# Patient Record
Sex: FEMALE | Race: OTHER | NOT HISPANIC OR LATINO
[De-identification: names, ages, dates, MRNs, and addresses within clinical notes are randomized per-mention and may not be internally consistent; named-entity substitution may affect disease eponyms.]

---

## 2019-02-03 PROBLEM — K60.2 ANAL FISSURE: Status: ACTIVE | Noted: 2019-02-03

## 2019-02-03 RX ORDER — ERGOCALCIFEROL 1.25 MG/1
1.25 MG CAPSULE, LIQUID FILLED ORAL
Refills: 0 | Status: ACTIVE | COMMUNITY

## 2019-02-03 RX ORDER — OMEPRAZOLE 40 MG/1
40 CAPSULE, DELAYED RELEASE ORAL
Qty: 90 | Refills: 3 | Status: ACTIVE | COMMUNITY

## 2019-02-03 RX ORDER — METFORMIN HYDROCHLORIDE 1000 MG/1
1000 TABLET, COATED ORAL
Qty: 180 | Refills: 3 | Status: ACTIVE | COMMUNITY

## 2019-02-05 ENCOUNTER — APPOINTMENT (OUTPATIENT)
Dept: HEART AND VASCULAR | Facility: CLINIC | Age: 54
End: 2019-02-05
Payer: COMMERCIAL

## 2019-02-05 DIAGNOSIS — K60.2 ANAL FISSURE, UNSPECIFIED: ICD-10-CM

## 2019-02-26 ENCOUNTER — NON-APPOINTMENT (OUTPATIENT)
Age: 54
End: 2019-02-26

## 2019-02-26 ENCOUNTER — TRANSCRIPTION ENCOUNTER (OUTPATIENT)
Age: 54
End: 2019-02-26

## 2019-02-26 ENCOUNTER — APPOINTMENT (OUTPATIENT)
Dept: HEART AND VASCULAR | Facility: CLINIC | Age: 54
End: 2019-02-26
Payer: COMMERCIAL

## 2019-02-26 VITALS
WEIGHT: 293 LBS | DIASTOLIC BLOOD PRESSURE: 84 MMHG | HEART RATE: 68 BPM | TEMPERATURE: 98.2 F | HEIGHT: 64 IN | BODY MASS INDEX: 50.02 KG/M2 | OXYGEN SATURATION: 99 % | SYSTOLIC BLOOD PRESSURE: 122 MMHG

## 2019-02-26 PROCEDURE — 99204 OFFICE O/P NEW MOD 45 MIN: CPT | Mod: 25

## 2019-02-26 PROCEDURE — 93000 ELECTROCARDIOGRAM COMPLETE: CPT

## 2019-02-26 RX ORDER — WARFARIN SODIUM 7.5 MG/1
7.5 TABLET ORAL DAILY
Refills: 0 | Status: ACTIVE | COMMUNITY

## 2019-02-26 RX ORDER — INSULIN GLARGINE 100 [IU]/ML
INJECTION, SOLUTION SUBCUTANEOUS
Refills: 0 | Status: ACTIVE | COMMUNITY

## 2019-02-28 ENCOUNTER — TRANSCRIPTION ENCOUNTER (OUTPATIENT)
Age: 54
End: 2019-02-28

## 2019-03-07 ENCOUNTER — MEDICATION RENEWAL (OUTPATIENT)
Age: 54
End: 2019-03-07

## 2019-03-14 ENCOUNTER — APPOINTMENT (OUTPATIENT)
Dept: HEART AND VASCULAR | Facility: CLINIC | Age: 54
End: 2019-03-14
Payer: COMMERCIAL

## 2019-04-02 ENCOUNTER — APPOINTMENT (OUTPATIENT)
Dept: HEART AND VASCULAR | Facility: CLINIC | Age: 54
End: 2019-04-02
Payer: COMMERCIAL

## 2019-04-02 VITALS
DIASTOLIC BLOOD PRESSURE: 82 MMHG | OXYGEN SATURATION: 99 % | SYSTOLIC BLOOD PRESSURE: 145 MMHG | TEMPERATURE: 98.8 F | HEART RATE: 71 BPM

## 2019-04-02 PROCEDURE — 93351 STRESS TTE COMPLETE: CPT

## 2019-04-02 NOTE — PHYSICAL EXAM
[S3] : no S3 [S4] : no S4 [Right Carotid Bruit] : no bruit heard over the right carotid [Left Carotid Bruit] : no bruit heard over the left carotid [Right Femoral Bruit] : no bruit heard over the right femoral artery [Left Femoral Bruit] : no bruit heard over the left femoral artery [FreeTextEntry1] : + insulin pump

## 2019-04-02 NOTE — ADDENDUM
[FreeTextEntry1] : 04/02/19: Ms. Mcduffie had an exercise stress echocardiogram today which revealed an EF of 58% and normal wall motion and PA pressures post 4.6 METS of activity.  She may, therefore, proceed with endoscopy and general anesthesia without cardiac contraindications. Ideally, the procedure should be performed on anticoagulation given her history of APL syndrome and multiple venous thromboembolism episodes.  Alternatively, she will need to "bridged" with Lovenox.

## 2019-04-02 NOTE — HISTORY OF PRESENT ILLNESS
[FreeTextEntry1] : Ms. Mcduffie presents for evaluation and management of antiphospholipid syndrome s/p multiple DVTs, SLE, Zamora's esophagus, CAD s/p PCI mid LAD, DM2, dyslipidemia, HTN, and obesity.  She was last seen on 10/20/16.  She will be having bariatric surgery with Cheko Leos MD at [facility] in [place].  She is following with a gastroenterologist, Jose Hauser MD and will have a colonoscopy and EGD in the near future.  She denies chest pain.  She has SABA to 2 flights of stairs.

## 2019-04-02 NOTE — REASON FOR VISIT
[FreeTextEntry1] : Diagnostic Tests:\par --------------------------------------------------------------\par ECG: \par 02/26/19: NSR, normal ECG. \par 10/20/16: NSR, normal ECG. \par 06/16/16: NSR, borderline LVH by voltage criteria. \par 04/10/14: NSR, normal ECG.\par 02/25/13: NSR, normal ECG.\par --------------------------------------------------------------\par Echo: \par 06/30/14: at [facility]: normal LV/RV size and function\par 06/11/12: EF normal, dilated LA, trace MR, PASP 23mmHg\par --------------------------------------------------------------\par Cath: \par 02/07/07: RCA mid 50%, LAD prox 50%, mid 70%, LCx mild, EF 50%, s/p PCI mid LAD (ANTHONY)\par --------------------------------------------------------------\par Stress:\par 07/01/16: regadenoson MPI: EF 53%, normal perfusion.\par 06/30/14: at [facility]: exercise MPI: normal perfusion\par 03/21/13 and 03/22/13: regadenoson MPI: normal perfusion, EF 73%.

## 2019-04-02 NOTE — ASSESSMENT
[FreeTextEntry1] : 1. CAD, s/p PCI mid LAD 02/07/07:\par            - continue aggressive medical management\par            - continue off ASA given need for lifelong systemic anticoagulation\par \par 2. Dyslipidemia:\par           - continue rosuvastatin 20mg po daily\par           - discussed therapeutic lifestyle changes to promote improved lipid metabolism\par           - patient will provide copy of recent lab work \par \par 3. HTN: BP at ACC/AHA 2017 guideline target\par           - continue bisoprolol/HCTZ 2.5/6.25mg po daily\par           - continue losartan 50mg po daily\par \par 4. DM2:\par           - discussed with patient therapeutic lifestyle changes to improve glucose metabolism\par           - continue metformin 1000mg po bid\par         \par 5. Antiphospholipid syndrome: SLE: s/p multiple DVTs\par           - continue lifelong systemic anticoagulation with warfarin (will try to obtain home POC INR monitor)\par           - discussed with patient avoidance of ASA and NSAIDS as well as need for bleeding surveillance.\par           - f/u with hematologist, Jimmy De León MD\par \par 6. Pre-operative for EGD/colonoscopy and bariatric surgery:\par           - will send for an exercise stress echocardiogram to r/o inducible ischemia\par           - will require "bridging" with Lovenox 150mg SC bid \par

## 2019-04-11 ENCOUNTER — MEDICATION RENEWAL (OUTPATIENT)
Age: 54
End: 2019-04-11

## 2019-04-11 DIAGNOSIS — H34.9 UNSPECIFIED RETINAL VASCULAR OCCLUSION: ICD-10-CM

## 2019-04-19 ENCOUNTER — APPOINTMENT (OUTPATIENT)
Dept: HEART AND VASCULAR | Facility: CLINIC | Age: 54
End: 2019-04-19
Payer: COMMERCIAL

## 2019-04-19 VITALS — DIASTOLIC BLOOD PRESSURE: 77 MMHG | SYSTOLIC BLOOD PRESSURE: 145 MMHG

## 2019-04-19 PROCEDURE — 93880 EXTRACRANIAL BILAT STUDY: CPT

## 2019-05-08 ENCOUNTER — APPOINTMENT (OUTPATIENT)
Dept: OTOLARYNGOLOGY | Facility: CLINIC | Age: 54
End: 2019-05-08
Payer: COMMERCIAL

## 2019-05-08 VITALS
TEMPERATURE: 97.9 F | OXYGEN SATURATION: 98 % | SYSTOLIC BLOOD PRESSURE: 120 MMHG | WEIGHT: 293 LBS | HEART RATE: 76 BPM | HEIGHT: 64 IN | DIASTOLIC BLOOD PRESSURE: 82 MMHG | BODY MASS INDEX: 50.02 KG/M2

## 2019-05-08 DIAGNOSIS — Z82.49 FAMILY HISTORY OF ISCHEMIC HEART DISEASE AND OTHER DISEASES OF THE CIRCULATORY SYSTEM: ICD-10-CM

## 2019-05-08 DIAGNOSIS — Z80.9 FAMILY HISTORY OF MALIGNANT NEOPLASM, UNSPECIFIED: ICD-10-CM

## 2019-05-08 DIAGNOSIS — Z81.8 FAMILY HISTORY OF OTHER MENTAL AND BEHAVIORAL DISORDERS: ICD-10-CM

## 2019-05-08 DIAGNOSIS — Z83.3 FAMILY HISTORY OF DIABETES MELLITUS: ICD-10-CM

## 2019-05-08 DIAGNOSIS — Z82.3 FAMILY HISTORY OF STROKE: ICD-10-CM

## 2019-05-08 DIAGNOSIS — Z78.9 OTHER SPECIFIED HEALTH STATUS: ICD-10-CM

## 2019-05-08 DIAGNOSIS — H61.21 IMPACTED CERUMEN, RIGHT EAR: ICD-10-CM

## 2019-05-08 PROCEDURE — 99205 OFFICE O/P NEW HI 60 MIN: CPT | Mod: 25

## 2019-05-08 PROCEDURE — 31575 DIAGNOSTIC LARYNGOSCOPY: CPT

## 2019-05-08 PROCEDURE — 69210 REMOVE IMPACTED EAR WAX UNI: CPT | Mod: RT

## 2019-05-08 RX ORDER — RANITIDINE 150 MG/1
150 TABLET ORAL
Qty: 180 | Refills: 1 | Status: ACTIVE | COMMUNITY
Start: 2019-05-08 | End: 1900-01-01

## 2019-05-08 NOTE — CONSULT LETTER
[Dear  ___] : Dear  [unfilled], [Consult Letter:] : I had the pleasure of evaluating your patient, [unfilled]. [Consult Closing:] : Thank you very much for allowing me to participate in the care of this patient.  If you have any questions, please do not hesitate to contact me. [Please see my note below.] : Please see my note below. [Sincerely,] : Sincerely, [FreeTextEntry3] : MADAI Chilel Jr, MD, FAAOHNS\par Otolaryngologist\par [facility]

## 2019-05-08 NOTE — PROCEDURE
[de-identified] : Indication: requirement for exam not possible via anterior examination; JORDAN, LPR\par After verbal consent and the administration of an aerosolized phenylephrine/lidocaine mix, examination was performed with a flexible endoscope placed through the nose. Findings:\par Nasopharynx: narrow\par Soft palate, lateral and posterior pharyngeal walls: unremarkable\par Base of tongue & lingual tonsil: large lingual tonsil w/ mod retrodisplacement\par Vallecula: unremarkable\par Epiglottis: unremarkable\par Piriform sinuses and pharyngoesophageal junction: unremarkable\par Arytenoids and AE folds: mod-severe interarytenoid edema\par Ventricle and false vocal folds: unremarkable\par True vocal folds: Smooth free edge; surface without ectasias or edema; normal movement bilaterally with good apposition in phonation\par Visible subglottis: unremarkable\par Other findings: ELM & pseudosulcus

## 2019-05-08 NOTE — HISTORY OF PRESENT ILLNESS
[de-identified] : 55 y/o F anticoagulated for antiphospholipid syndrome w/ multiple DVTs who recently had a carotid duplex which showed an incidental finding of a 2.6 cm complex L thyroid nodule. Her GM had thyroid surgery; doesn't know for what. No exp to ionizing radiation. Denies dysphagia. However, complains of a raspy voice intermittently. \par Takes omeprazole 40mg po qd for reflux, which is mostly controlled. Globus w/ clearing. \par JORDAN on CPAP; doesn't know her severity. Preop for bariatric surgery. \par Uses qtips.

## 2019-05-08 NOTE — PHYSICAL EXAM
[de-identified] : very thick, difficult to examine [FreeTextEntry8] : obstructing cerumen removed w/ an alligator [Laryngoscopy Performed] : laryngoscopy was performed, see procedure section for findings [de-identified] : mFTP 3-4, very narrow OP [Normal] : no rashes

## 2019-05-08 NOTE — ASSESSMENT
[FreeTextEntry1] : Agree w/ bariatrics. Reviewed reflux precautions and provided the patient with the corresponding educational handout. Add qhs ranitidine. \par Thyroid sono and RTC\par Avoid qtips.

## 2019-05-10 ENCOUNTER — FORM ENCOUNTER (OUTPATIENT)
Age: 54
End: 2019-05-10

## 2019-05-11 ENCOUNTER — APPOINTMENT (OUTPATIENT)
Dept: ULTRASOUND IMAGING | Facility: CLINIC | Age: 54
End: 2019-05-11
Payer: COMMERCIAL

## 2019-05-11 ENCOUNTER — OUTPATIENT (OUTPATIENT)
Dept: OUTPATIENT SERVICES | Facility: HOSPITAL | Age: 54
LOS: 1 days | End: 2019-05-11

## 2019-05-11 PROCEDURE — 76536 US EXAM OF HEAD AND NECK: CPT | Mod: 26

## 2019-05-18 PROBLEM — E11.9 TYPE II DIABETES MELLITUS: Status: ACTIVE | Noted: 2019-02-03

## 2019-05-18 PROBLEM — I10 HTN (HYPERTENSION), BENIGN: Status: ACTIVE | Noted: 2019-02-03

## 2019-05-18 PROBLEM — E66.01 MORBID OBESITY: Status: ACTIVE | Noted: 2019-02-26

## 2019-05-18 PROBLEM — M32.9 LUPUS (SYSTEMIC LUPUS ERYTHEMATOSUS): Status: ACTIVE | Noted: 2019-02-03

## 2019-05-22 ENCOUNTER — APPOINTMENT (OUTPATIENT)
Dept: OTOLARYNGOLOGY | Facility: CLINIC | Age: 54
End: 2019-05-22
Payer: COMMERCIAL

## 2019-05-22 VITALS
SYSTOLIC BLOOD PRESSURE: 149 MMHG | BODY MASS INDEX: 50.02 KG/M2 | WEIGHT: 293 LBS | DIASTOLIC BLOOD PRESSURE: 82 MMHG | OXYGEN SATURATION: 97 % | HEART RATE: 72 BPM | HEIGHT: 64 IN | TEMPERATURE: 99.2 F

## 2019-05-22 PROCEDURE — 99214 OFFICE O/P EST MOD 30 MIN: CPT

## 2019-05-22 NOTE — ASSESSMENT
[FreeTextEntry1] : Sono-guided FNA and RTC. She will need to coordinate her coumadin as per her hematologist, who has handled invasive procedures for her before using a Lovenox bridge.

## 2019-05-22 NOTE — HISTORY OF PRESENT ILLNESS
[de-identified] : 53 y/o F anticoagulated for antiphospholipid syndrome w/ multiple DVTs who recently had a carotid duplex which showed an incidental finding of a 2.6 cm complex L thyroid nodule; now f/u a sono. Her GM had thyroid surgery; doesn't know for what. No exp to ionizing radiation. Denies dysphagia. However, complains of a raspy voice intermittently; no change in this yet. . \par Takes omeprazole 40mg po qd for reflux, which is better controlled. Globus w/ clearing that is unchanged. \par JORDAN on CPAP; doesn't know her severity. Preop for bariatric surgery. \par Uses qtips.

## 2019-05-22 NOTE — PHYSICAL EXAM
[Normal] : the left external ear was normal [de-identified] : very thick, difficult to examine [de-identified] : mFTP 3-4, very narrow OP

## 2019-05-23 ENCOUNTER — APPOINTMENT (OUTPATIENT)
Dept: HEART AND VASCULAR | Facility: CLINIC | Age: 54
End: 2019-05-23

## 2019-05-23 DIAGNOSIS — E66.01 MORBID (SEVERE) OBESITY DUE TO EXCESS CALORIES: ICD-10-CM

## 2019-05-23 DIAGNOSIS — I10 ESSENTIAL (PRIMARY) HYPERTENSION: ICD-10-CM

## 2019-05-23 DIAGNOSIS — E11.9 TYPE 2 DIABETES MELLITUS W/OUT COMPLICATIONS: ICD-10-CM

## 2019-05-23 DIAGNOSIS — M32.9 SYSTEMIC LUPUS ERYTHEMATOSUS, UNSPECIFIED: ICD-10-CM

## 2019-06-05 ENCOUNTER — FORM ENCOUNTER (OUTPATIENT)
Age: 54
End: 2019-06-05

## 2019-06-06 ENCOUNTER — APPOINTMENT (OUTPATIENT)
Dept: ULTRASOUND IMAGING | Facility: CLINIC | Age: 54
End: 2019-06-06
Payer: COMMERCIAL

## 2019-06-06 ENCOUNTER — RESULT REVIEW (OUTPATIENT)
Age: 54
End: 2019-06-06

## 2019-06-06 ENCOUNTER — OUTPATIENT (OUTPATIENT)
Dept: OUTPATIENT SERVICES | Facility: HOSPITAL | Age: 54
LOS: 1 days | End: 2019-06-06

## 2019-06-06 PROCEDURE — 10005 FNA BX W/US GDN 1ST LES: CPT

## 2019-06-07 LAB — NON-GYNECOLOGICAL CYTOLOGY STUDY: SIGNIFICANT CHANGE UP

## 2019-06-18 ENCOUNTER — APPOINTMENT (OUTPATIENT)
Dept: OTOLARYNGOLOGY | Facility: CLINIC | Age: 54
End: 2019-06-18
Payer: COMMERCIAL

## 2019-06-18 VITALS
HEIGHT: 64 IN | BODY MASS INDEX: 50.02 KG/M2 | OXYGEN SATURATION: 98 % | WEIGHT: 293 LBS | TEMPERATURE: 99.4 F | DIASTOLIC BLOOD PRESSURE: 88 MMHG | SYSTOLIC BLOOD PRESSURE: 135 MMHG | HEART RATE: 74 BPM

## 2019-06-18 PROCEDURE — 99214 OFFICE O/P EST MOD 30 MIN: CPT

## 2019-06-18 NOTE — PHYSICAL EXAM
[de-identified] : very thick, difficult to examine [de-identified] : mFTP 3-4, very narrow OP [Normal] : the left external ear was normal

## 2019-06-18 NOTE — HISTORY OF PRESENT ILLNESS
[de-identified] : 53 y/o F anticoagulated for antiphospholipid syndrome w/ multiple DVTs who recently had a carotid duplex which showed an incidental finding of a 2.6 cm complex L thyroid nodule; now f/u an FNA. Her GM had thyroid surgery; doesn't know for what. No exp to ionizing radiation. Denies dysphagia. However, complains of a raspy voice intermittently; this has improved somewhat since last seen. \par Takes omeprazole 40mg po qd for reflux, which is better controlled. Globus w/ clearing that is somewhat improved. \par JORDAN on CPAP; doesn't know her severity. Preop for bariatric surgery.

## 2019-06-18 NOTE — DATA REVIEWED
[de-identified] : FNA: BC III [de-identified] : 5/19 sono: single L 3.9 x 2.0 x 2.4 cm hypoechoic nodule

## 2019-07-10 ENCOUNTER — FORM ENCOUNTER (OUTPATIENT)
Age: 54
End: 2019-07-10

## 2019-07-11 ENCOUNTER — OUTPATIENT (OUTPATIENT)
Dept: OUTPATIENT SERVICES | Facility: HOSPITAL | Age: 54
LOS: 1 days | End: 2019-07-11

## 2019-07-11 ENCOUNTER — APPOINTMENT (OUTPATIENT)
Dept: ULTRASOUND IMAGING | Facility: HOSPITAL | Age: 54
End: 2019-07-11

## 2019-07-11 ENCOUNTER — RESULT REVIEW (OUTPATIENT)
Age: 54
End: 2019-07-11

## 2019-07-11 ENCOUNTER — OUTPATIENT (OUTPATIENT)
Dept: OUTPATIENT SERVICES | Facility: HOSPITAL | Age: 54
LOS: 1 days | End: 2019-07-11
Payer: COMMERCIAL

## 2019-07-11 PROCEDURE — 10005 FNA BX W/US GDN 1ST LES: CPT

## 2019-07-11 PROCEDURE — 88305 TISSUE EXAM BY PATHOLOGIST: CPT

## 2019-07-11 PROCEDURE — 88173 CYTOPATH EVAL FNA REPORT: CPT

## 2019-07-12 LAB — NON-GYNECOLOGICAL CYTOLOGY STUDY: SIGNIFICANT CHANGE UP

## 2019-07-22 ENCOUNTER — APPOINTMENT (OUTPATIENT)
Dept: OTOLARYNGOLOGY | Facility: CLINIC | Age: 54
End: 2019-07-22
Payer: COMMERCIAL

## 2019-07-22 VITALS
BODY MASS INDEX: 50.02 KG/M2 | TEMPERATURE: 99.6 F | SYSTOLIC BLOOD PRESSURE: 141 MMHG | HEIGHT: 64 IN | DIASTOLIC BLOOD PRESSURE: 86 MMHG | HEART RATE: 90 BPM | WEIGHT: 293 LBS | OXYGEN SATURATION: 97 %

## 2019-07-22 DIAGNOSIS — G47.33 OBSTRUCTIVE SLEEP APNEA (ADULT) (PEDIATRIC): ICD-10-CM

## 2019-07-22 DIAGNOSIS — K21.9 GASTRO-ESOPHAGEAL REFLUX DISEASE W/OUT ESOPHAGITIS: ICD-10-CM

## 2019-07-22 DIAGNOSIS — E04.1 NONTOXIC SINGLE THYROID NODULE: ICD-10-CM

## 2019-07-22 PROCEDURE — 99214 OFFICE O/P EST MOD 30 MIN: CPT

## 2019-07-22 NOTE — DATA REVIEWED
[de-identified] : FNA: BC III\par rpt: BC III; thyroseq pending [de-identified] : 5/19 sono: single L 3.9 x 2.0 x 2.4 cm hypoechoic nodule

## 2019-07-22 NOTE — HISTORY OF PRESENT ILLNESS
[de-identified] : 55 y/o F anticoagulated for antiphospholipid syndrome w/ multiple DVTs who recently had a carotid duplex which showed an incidental finding of a 2.6 cm complex L thyroid nodule; now f/u a rpt FNA. Her GM had thyroid surgery; doesn't know for what. No exp to ionizing radiation. Denies dysphagia. However, complains of a raspy voice intermittently; this continues to improve. \par Takes omeprazole 40mg po qd for reflux, which is better controlled. Globus w/ clearing that is somewhat improved. \par JORDAN on CPAP; doesn't know her severity. Preop for bariatric surgery.

## 2019-07-22 NOTE — PHYSICAL EXAM
[Normal] : the left external ear was normal [de-identified] : very thick, difficult to examine [de-identified] : mFTP 3-4, very narrow OP

## 2019-11-21 ENCOUNTER — APPOINTMENT (OUTPATIENT)
Dept: HEART AND VASCULAR | Facility: CLINIC | Age: 54
End: 2019-11-21

## 2020-02-27 RX ORDER — BISOPROLOL FUMARATE AND HYDROCHLOROTHIAZIDE 2.5; 6.25 MG/1; MG/1
2.5-6.25 TABLET, FILM COATED ORAL DAILY
Qty: 90 | Refills: 3 | Status: ACTIVE | COMMUNITY
Start: 1900-01-01 | End: 1900-01-01

## 2020-02-27 RX ORDER — IRBESARTAN 150 MG/1
150 TABLET ORAL DAILY
Qty: 90 | Refills: 3 | Status: ACTIVE | COMMUNITY
Start: 2020-02-27 | End: 1900-01-01

## 2021-02-26 ENCOUNTER — TRANSCRIPTION ENCOUNTER (OUTPATIENT)
Age: 56
End: 2021-02-26

## 2021-02-28 PROBLEM — E78.5 DYSLIPIDEMIA: Status: ACTIVE | Noted: 2019-02-03

## 2021-02-28 PROBLEM — K22.70 BARRETT ESOPHAGUS: Status: ACTIVE | Noted: 2019-02-03

## 2021-02-28 PROBLEM — I25.10 CAD (CORONARY ARTERY DISEASE): Status: ACTIVE | Noted: 2019-02-03

## 2021-02-28 PROBLEM — D68.61 ANTIPHOSPHOLIPID SYNDROME: Status: ACTIVE | Noted: 2019-02-03

## 2021-03-02 ENCOUNTER — APPOINTMENT (OUTPATIENT)
Dept: HEART AND VASCULAR | Facility: CLINIC | Age: 56
End: 2021-03-02
Payer: COMMERCIAL

## 2021-03-02 VITALS
HEIGHT: 64 IN | WEIGHT: 290 LBS | DIASTOLIC BLOOD PRESSURE: 82 MMHG | SYSTOLIC BLOOD PRESSURE: 127 MMHG | HEART RATE: 85 BPM | BODY MASS INDEX: 49.51 KG/M2

## 2021-03-02 DIAGNOSIS — Z01.818 ENCOUNTER FOR OTHER PREPROCEDURAL EXAMINATION: ICD-10-CM

## 2021-03-02 DIAGNOSIS — I25.10 ATHEROSCLEROTIC HEART DISEASE OF NATIVE CORONARY ARTERY W/OUT ANGINA PECTORIS: ICD-10-CM

## 2021-03-02 DIAGNOSIS — E78.5 HYPERLIPIDEMIA, UNSPECIFIED: ICD-10-CM

## 2021-03-02 DIAGNOSIS — D68.61 ANTIPHOSPHOLIPID SYNDROME: ICD-10-CM

## 2021-03-02 DIAGNOSIS — Z00.00 ENCOUNTER FOR GENERAL ADULT MEDICAL EXAMINATION W/OUT ABNORMAL FINDINGS: ICD-10-CM

## 2021-03-02 DIAGNOSIS — K22.70 BARRETT'S ESOPHAGUS W/OUT DYSPLASIA: ICD-10-CM

## 2021-03-02 PROCEDURE — 99072 ADDL SUPL MATRL&STAF TM PHE: CPT

## 2021-03-02 PROCEDURE — 99214 OFFICE O/P EST MOD 30 MIN: CPT

## 2021-03-02 RX ORDER — LATANOPROST/PF 0.005 %
0.01 DROPS OPHTHALMIC (EYE)
Refills: 0 | Status: ACTIVE | COMMUNITY

## 2021-03-02 RX ORDER — GABAPENTIN 300 MG/1
300 CAPSULE ORAL TWICE DAILY
Refills: 0 | Status: ACTIVE | COMMUNITY

## 2021-03-02 RX ORDER — INSULIN DEGLUDEC INJECTION 100 U/ML
100 INJECTION, SOLUTION SUBCUTANEOUS
Refills: 0 | Status: ACTIVE | COMMUNITY

## 2021-03-17 ENCOUNTER — TRANSCRIPTION ENCOUNTER (OUTPATIENT)
Age: 56
End: 2021-03-17

## 2021-03-17 PROBLEM — Z00.00 ENCOUNTER FOR PREVENTIVE HEALTH EXAMINATION: Status: ACTIVE | Noted: 2019-01-09

## 2021-04-09 ENCOUNTER — APPOINTMENT (OUTPATIENT)
Dept: HEART AND VASCULAR | Facility: CLINIC | Age: 56
End: 2021-04-09
Payer: COMMERCIAL

## 2021-04-09 ENCOUNTER — NON-APPOINTMENT (OUTPATIENT)
Age: 56
End: 2021-04-09

## 2021-04-09 PROCEDURE — 93306 TTE W/DOPPLER COMPLETE: CPT

## 2021-04-09 PROCEDURE — 99072 ADDL SUPL MATRL&STAF TM PHE: CPT

## 2021-04-09 NOTE — ADDENDUM
[FreeTextEntry1] : 03/17/21: Ms. Mcduffie is preoperative for a uterine biopsy.  She has a h/o CAD s/p PCI mid LAD.  Her coronary disease is stable with no active CV symptoms.  She had a normal stress test in 2019.  In addition, she has a history of APL s/p multiple DVTs.  She has no cardiac contraindications to the planned procedure but will need to communicate with the anticoagulation clinic at [facility] to provide instructions surrounding "bridging" warfarin with enoxaparin.

## 2021-04-09 NOTE — REASON FOR VISIT
[Follow-Up - Clinic] : a clinic follow-up of [FreeTextEntry1] : Diagnostic Tests:\par --------------------------------------------------------------\par ECG: \par 02/26/19: NSR, normal ECG. \par 10/20/16: NSR, normal ECG. \par 06/16/16: NSR, borderline LVH by voltage criteria. \par 04/10/14: NSR, normal ECG.\par 02/25/13: NSR, normal ECG.\par --------------------------------------------------------------\par Echo: \par 04/09/21: EF 64%, normal study. \par 06/30/14: at [facility]: normal LV/RV size and function\par 06/11/12: EF normal, dilated LA, trace MR, PASP 23mmHg\par --------------------------------------------------------------\par Cath: \par 02/07/07: RCA mid 50%, LAD prox 50%, mid 70%, LCx mild, EF 50%, s/p PCI mid LAD (ANTHONY)\par --------------------------------------------------------------\par Stress:\par 04/02/19: exercise stress echo: EF 58%, 4.6 METS, normal wall motion and PA pressures. \par 07/01/16: regadenoson MPI: EF 53%, normal perfusion.\par 06/30/14: at [facility]: exercise MPI: normal perfusion\par 03/21/13 and 03/22/13: regadenoson MPI: normal perfusion, EF 73%.\par --------------------------------------------------------------\par Carotid:\par 04/18/19: sono: b/l prox ICA 1-19%, large left thyroid nodule.

## 2021-04-09 NOTE — HISTORY OF PRESENT ILLNESS
[FreeTextEntry1] : Ms. Mcduffie presents for follow up and management of antiphospholipid syndrome s/p multiple DVTs, SLE, Zamora's esophagus, CAD s/p PCI mid LAD, DM2, dyslipidemia, HTN, and obesity.  She was last seen on 02/26/19.  She did not undergo bariatric surgery with Cheko Leos MD at [facility] in [place].  SHe had a second opinion with a weight loss surgeon at [facility], Dr. Bradley and is reconsidering it at this time.   She is following with a gastroenterologist, Jose Hauser MD.  She had an exercise stress echocardiogram on 04/02/19 which revealed an EF of 58%, 4.6 METS, and normal wall motion and PA pressures. She denies chest pain.  On 04/09/21 she had an echocardiogram which revealed an EF of 64% and was a normal study.  She has SABA to 2 flights of stairs.

## 2021-04-09 NOTE — PHYSICAL EXAM
[General Appearance - Well Developed] : well developed [Normal Appearance] : normal appearance [Well Groomed] : well groomed [General Appearance - Well Nourished] : well nourished [No Deformities] : no deformities [General Appearance - In No Acute Distress] : no acute distress [Normal Conjunctiva] : the conjunctiva exhibited no abnormalities [Eyelids - No Xanthelasma] : the eyelids demonstrated no xanthelasmas [Normal Oral Mucosa] : normal oral mucosa [No Oral Pallor] : no oral pallor [No Oral Cyanosis] : no oral cyanosis [Normal Jugular Venous A Waves Present] : normal jugular venous A waves present [Normal Jugular Venous V Waves Present] : normal jugular venous V waves present [No Jugular Venous Chu A Waves] : no jugular venous chu A waves [Exaggerated Use Of Accessory Muscles For Inspiration] : no accessory muscle use [Respiration, Rhythm And Depth] : normal respiratory rhythm and effort [Auscultation Breath Sounds / Voice Sounds] : lungs were clear to auscultation bilaterally [Abdomen Soft] : soft [Abdomen Tenderness] : non-tender [FreeTextEntry1] : + insulin pump [Abdomen Mass (___ Cm)] : no abdominal mass palpated [Abnormal Walk] : normal gait [Gait - Sufficient For Exercise Testing] : the gait was sufficient for exercise testing [Skin Color & Pigmentation] : normal skin color and pigmentation [] : no rash [No Venous Stasis] : no venous stasis [Skin Lesions] : no skin lesions [No Skin Ulcers] : no skin ulcer [No Xanthoma] : no  xanthoma was observed [Oriented To Time, Place, And Person] : oriented to person, place, and time [Affect] : the affect was normal [Mood] : the mood was normal [No Anxiety] : not feeling anxious [Normal Rate] : normal [Normal S1] : normal S1 [Normal S2] : normal S2 [S3] : no S3 [S4] : no S4 [No Murmur] : no murmurs heard [Right Carotid Bruit] : no bruit heard over the right carotid [Left Carotid Bruit] : no bruit heard over the left carotid [Right Femoral Bruit] : no bruit heard over the right femoral artery [Left Femoral Bruit] : no bruit heard over the left femoral artery [2+] : left 2+ [No Abnormalities] : the abdominal aorta was not enlarged and no bruit was heard [No Pitting Edema] : no pitting edema present

## 2021-04-10 LAB
ALBUMIN SERPL ELPH-MCNC: 4.3 G/DL
ALP BLD-CCNC: 93 U/L
ALT SERPL-CCNC: 17 U/L
ANION GAP SERPL CALC-SCNC: 11 MMOL/L
APTT BLD: 45 SEC
AST SERPL-CCNC: 15 U/L
BASOPHILS # BLD AUTO: 0.06 K/UL
BASOPHILS NFR BLD AUTO: 0.7 %
BILIRUB SERPL-MCNC: 0.3 MG/DL
BUN SERPL-MCNC: 9 MG/DL
CALCIUM SERPL-MCNC: 9.8 MG/DL
CHLORIDE SERPL-SCNC: 105 MMOL/L
CHOLEST SERPL-MCNC: 153 MG/DL
CO2 SERPL-SCNC: 29 MMOL/L
CREAT SERPL-MCNC: 0.72 MG/DL
EOSINOPHIL # BLD AUTO: 0.28 K/UL
EOSINOPHIL NFR BLD AUTO: 3.1 %
ESTIMATED AVERAGE GLUCOSE: 200 MG/DL
GLUCOSE SERPL-MCNC: 145 MG/DL
HBA1C MFR BLD HPLC: 8.6 %
HCT VFR BLD CALC: 44.2 %
HDLC SERPL-MCNC: 42 MG/DL
HGB BLD-MCNC: 13.7 G/DL
IMM GRANULOCYTES NFR BLD AUTO: 0.2 %
INR PPP: 1.94 RATIO
LDLC SERPL CALC-MCNC: 82 MG/DL
LDLC SERPL DIRECT ASSAY-MCNC: 83 MG/DL
LYMPHOCYTES # BLD AUTO: 2.96 K/UL
LYMPHOCYTES NFR BLD AUTO: 32.7 %
MAN DIFF?: NORMAL
MCHC RBC-ENTMCNC: 28.1 PG
MCHC RBC-ENTMCNC: 31 GM/DL
MCV RBC AUTO: 90.6 FL
MONOCYTES # BLD AUTO: 0.89 K/UL
MONOCYTES NFR BLD AUTO: 9.8 %
NEUTROPHILS # BLD AUTO: 4.85 K/UL
NEUTROPHILS NFR BLD AUTO: 53.5 %
NONHDLC SERPL-MCNC: 111 MG/DL
PLATELET # BLD AUTO: 329 K/UL
POTASSIUM SERPL-SCNC: 4.5 MMOL/L
PROT SERPL-MCNC: 7.1 G/DL
PT BLD: 22.2 SEC
RBC # BLD: 4.88 M/UL
RBC # FLD: 14.5 %
SODIUM SERPL-SCNC: 144 MMOL/L
TRIGL SERPL-MCNC: 143 MG/DL
TSH SERPL-ACNC: 1.72 UIU/ML
WBC # FLD AUTO: 9.06 K/UL

## 2021-04-10 NOTE — ADDENDUM
[FreeTextEntry1] : 03/17/21: Ms. Mcduffie is preoperative for a uterine biopsy.  She has a h/o CAD s/p PCI mid LAD.  Her coronary disease is stable with no active CV symptoms.  She had a normal stress test in 2019.  In addition, she has a history of APL s/p multiple DVTs.  She has no cardiac contraindications to the planned procedure but will need to communicate with the [place] [facility] [place] instructions surrounding "bridging" warfarin with enoxaparin.

## 2021-04-10 NOTE — PHYSICAL EXAM
[FreeTextEntry1] : + insulin pump [S3] : no S3 [Right Carotid Bruit] : no bruit heard over the right carotid [S4] : no S4 [Left Carotid Bruit] : no bruit heard over the left carotid [Right Femoral Bruit] : no bruit heard over the right femoral artery [Left Femoral Bruit] : no bruit heard over the left femoral artery

## 2021-04-10 NOTE — ASSESSMENT
[FreeTextEntry1] : 1. CAD, s/p PCI mid LAD 02/07/07: s/p 04/02/19: exercise stress echo: EF 58%, 4.6 METS, normal wall motion and PA pressures. \par            - continue aggressive medical management\par            - continue off ASA given need for lifelong systemic anticoagulation\par            - will send for an echocardiogram \par \par 2. Dyslipidemia:\par           - continue rosuvastatin 20mg po daily\par           - discussed therapeutic lifestyle changes to promote improved lipid metabolism\par           - check lab work with next in-person visit \par \par 3. HTN: BP at ACC/AHA 2017 guideline target\par           - continue bisoprolol/HCTZ 2.5/6.25mg po daily\par           - continue losartan 50mg po daily\par \par 4. DM2:\par           - discussed with patient therapeutic lifestyle changes to improve glucose metabolism\par           - continue metformin 1000mg po bid\par           - continue Tesiba\par         \par 5. Antiphospholipid syndrome: SLE: s/p multiple DVTs\par           - continue lifelong systemic anticoagulation with warfarin (will try to obtain home POC INR monitor)\par           - discussed with patient avoidance of ASA and NSAIDS as well as need for bleeding surveillance.\par           - f/u with hematologist, Jimmy De León MD\par \par Due to the current global COVID-19 pandemic and the recommendations for social distancing this encounter was converted from an in-person face-to-face encounter to a telehealth encounter employing the Dispop, Quintura, or other approved audio/video platform.  All components of the evaluation and management were performed per clinical routine with the exception of the physical exam.  The physical exam references my most recent physical exam plus any additional information provided by the patient (i.e. ambulatory vitals/weight) or inspection from the video portion of the encounter. \par \par I spent in excess of 40 minutes on the encounter.  \par \par Verbal consent was given on 03/02/21 by Arabella Mcduffie. \par \par Patient location: The patient was located at the primary address listed in the medical record.\par Physician location: I was located in my office in [facility]. \par

## 2021-04-10 NOTE — REASON FOR VISIT
[Follow-Up - Clinic] : a clinic follow-up of [FreeTextEntry1] : Diagnostic Tests:\par --------------------------------------------------------------\par ECG: \par 02/26/19: NSR, normal ECG. \par 10/20/16: NSR, normal ECG. \par 06/16/16: NSR, borderline LVH by voltage criteria. \par 04/10/14: NSR, normal ECG.\par 02/25/13: NSR, normal ECG.\par --------------------------------------------------------------\par Echo: \par 06/30/14: at [facility]: normal LV/RV size and function\par 06/11/12: EF normal, dilated LA, trace MR, PASP 23mmHg\par --------------------------------------------------------------\par Cath: \par 02/07/07: RCA mid 50%, LAD prox 50%, mid 70%, LCx mild, EF 50%, s/p PCI mid LAD (ANTHONY)\par --------------------------------------------------------------\par Stress:\par 04/02/19: exercise stress echo: EF 58%, 4.6 METS, normal wall motion and PA pressures. \par 07/01/16: regadenoson MPI: EF 53%, normal perfusion.\par 06/30/14: at [facility]: exercise MPI: normal perfusion\par 03/21/13 and 03/22/13: regadenoson MPI: normal perfusion, EF 73%.\par --------------------------------------------------------------\par Carotid:\par 04/18/19: sono: b/l prox ICA 1-19%, large left thyroid nodule.

## 2021-04-10 NOTE — HISTORY OF PRESENT ILLNESS
[FreeTextEntry1] : Ms. Mcduffie presents for follow up and management of antiphospholipid syndrome s/p multiple DVTs, SLE, Zamora's esophagus, CAD s/p PCI mid LAD, DM2, dyslipidemia, HTN, and obesity.  She was last seen on 02/26/19.  She did not undergo bariatric surgery with Cheko Leos MD at [facility] in [place].  SHe had a second opinion with a weight loss surgeon at [facility], Dr. Bradley and is reconsidering it at this time.   She is following with a gastroenterologist, Jose Hauser MD.  She had an exercise stress echocardiogram on 04/02/19 which revealed an EF of 58%, 4.6 METS, and normal wall motion and PA pressures. She denies chest pain.  She has SABA to 2 flights of stairs.

## 2021-06-21 ENCOUNTER — TRANSCRIPTION ENCOUNTER (OUTPATIENT)
Age: 56
End: 2021-06-21

## 2021-08-28 ENCOUNTER — RX RENEWAL (OUTPATIENT)
Age: 56
End: 2021-08-28

## 2021-08-28 RX ORDER — ROSUVASTATIN CALCIUM 20 MG/1
20 TABLET, FILM COATED ORAL DAILY
Qty: 90 | Refills: 3 | Status: ACTIVE | COMMUNITY
Start: 2021-08-28 | End: 1900-01-01

## 2023-10-27 NOTE — ASSESSMENT
[FreeTextEntry1] : 1. CAD, s/p PCI mid LAD 02/07/07: s/p 04/02/19: exercise stress echo: EF 58%, 4.6 METS, normal wall motion and PA pressures. \par            - continue aggressive medical management\par            - continue off ASA given need for lifelong systemic anticoagulation\par            - will send for an echocardiogram \par \par 2. Dyslipidemia:\par           - continue rosuvastatin 20mg po daily\par           - discussed therapeutic lifestyle changes to promote improved lipid metabolism\par           - check lab work with next in-person visit \par \par 3. HTN: BP at ACC/AHA 2017 guideline target\par           - continue bisoprolol/HCTZ 2.5/6.25mg po daily\par           - continue losartan 50mg po daily\par \par 4. DM2:\par           - discussed with patient therapeutic lifestyle changes to improve glucose metabolism\par           - continue metformin 1000mg po bid\par           - continue Tesiba\par         \par 5. Antiphospholipid syndrome: SLE: s/p multiple DVTs\par           - continue lifelong systemic anticoagulation with warfarin (will try to obtain home POC INR monitor)\par           - discussed with patient avoidance of ASA and NSAIDS as well as need for bleeding surveillance.\par           - f/u with hematologist, Jimmy De León MD\par \par Due to the current global COVID-19 pandemic and the recommendations for social distancing this encounter was converted from an in-person face-to-face encounter to a telehealth encounter employing the Elpas, Xigen, or other approved audio/video platform.  All components of the evaluation and management were performed per clinical routine with the exception of the physical exam.  The physical exam references my most recent physical exam plus any additional information provided by the patient (i.e. ambulatory vitals/weight) or inspection from the video portion of the encounter. \par \par I spent in excess of 40 minutes on the encounter.  \par \par Verbal consent was given on 03/02/21 by Arabella Mcduffie. \par \par Patient location: The patient was located at the primary address listed in the medical record.\par Physician location: I was located in my office in [facility]. \par 
No - the patient is unable to be screened due to medical condition